# Patient Record
Sex: MALE | Race: WHITE | Employment: OTHER | URBAN - METROPOLITAN AREA
[De-identification: names, ages, dates, MRNs, and addresses within clinical notes are randomized per-mention and may not be internally consistent; named-entity substitution may affect disease eponyms.]

---

## 2019-06-28 ENCOUNTER — HOSPITAL ENCOUNTER (EMERGENCY)
Facility: CLINIC | Age: 29
Discharge: HOME | End: 2019-06-28
Attending: EMERGENCY MEDICINE | Admitting: EMERGENCY MEDICINE
Payer: MEDICAID

## 2019-06-28 ENCOUNTER — APPOINTMENT (OUTPATIENT)
Dept: CT IMAGING | Facility: CLINIC | Age: 29
End: 2019-06-28
Attending: EMERGENCY MEDICINE
Payer: MEDICAID

## 2019-06-28 VITALS
TEMPERATURE: 98.7 F | RESPIRATION RATE: 18 BRPM | BODY MASS INDEX: 20.4 KG/M2 | DIASTOLIC BLOOD PRESSURE: 90 MMHG | WEIGHT: 130 LBS | OXYGEN SATURATION: 100 % | HEIGHT: 67 IN | SYSTOLIC BLOOD PRESSURE: 119 MMHG | HEART RATE: 82 BPM

## 2019-06-28 DIAGNOSIS — S09.90XA CLOSED HEAD INJURY, INITIAL ENCOUNTER: ICD-10-CM

## 2019-06-28 DIAGNOSIS — S00.81XA ABRASION OF FOREHEAD, INITIAL ENCOUNTER: ICD-10-CM

## 2019-06-28 DIAGNOSIS — S06.0X0A CONCUSSION WITHOUT LOSS OF CONSCIOUSNESS, INITIAL ENCOUNTER: ICD-10-CM
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
## 2019-06-28 DIAGNOSIS — S50.811A ABRASION OF RIGHT FOREARM, INITIAL ENCOUNTER: ICD-10-CM

## 2019-06-28 DIAGNOSIS — S00.83XA HEMATOMA OF OCCIPITAL SURFACE OF HEAD, INITIAL ENCOUNTER: ICD-10-CM

## 2019-06-28 DIAGNOSIS — S01.81XA LACERATION OF FOREHEAD, INITIAL ENCOUNTER: ICD-10-CM

## 2019-06-28 PROCEDURE — 27210282 ZZH ADHESIVE DERMABOND SKIN

## 2019-06-28 PROCEDURE — 99284 EMERGENCY DEPT VISIT MOD MDM: CPT | Mod: 25

## 2019-06-28 PROCEDURE — 25000125 ZZHC RX 250: Performed by: EMERGENCY MEDICINE

## 2019-06-28 PROCEDURE — 70450 CT HEAD/BRAIN W/O DYE: CPT

## 2019-06-28 PROCEDURE — 12013 RPR F/E/E/N/L/M 2.6-5.0 CM: CPT

## 2019-06-28 RX ORDER — LIDOCAINE HYDROCHLORIDE 20 MG/ML
JELLY TOPICAL
Status: DISCONTINUED
Start: 2019-06-28 | End: 2019-06-28 | Stop reason: HOSPADM

## 2019-06-28 RX ORDER — LIDOCAINE HYDROCHLORIDE 20 MG/ML
10 JELLY TOPICAL ONCE
Status: COMPLETED | OUTPATIENT
Start: 2019-06-28 | End: 2019-06-28

## 2019-06-28 RX ADMIN — LIDOCAINE HYDROCHLORIDE 10 ML: 20 JELLY TOPICAL at 03:31

## 2019-06-28 ASSESSMENT — ENCOUNTER SYMPTOMS
MYALGIAS: 1
WOUND: 1
SHORTNESS OF BREATH: 0
ABDOMINAL PAIN: 0

## 2019-06-28 ASSESSMENT — MIFFLIN-ST. JEOR: SCORE: 1523.31
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
## 2019-06-28 NOTE — DISCHARGE INSTRUCTIONS
Discharge Instructions  Head Injury    You have been seen today for a head injury. Your evaluation included a history and physical examination. You may have had a CT (CAT) scan performed, though most head injuries do not require a scan. Based on this evaluation, your provider today does not feel that your head injury is serious.    Generally, every Emergency Department visit should have a follow-up clinic visit with either a primary or a specialty clinic/provider. Please follow-up as instructed by your emergency provider today.  Return to the Emergency Department if:  You are confused or you are not acting right.  Your headache gets worse or you start to have a really bad headache even with your recommended treatment plan.  You vomit (throw up) more than once.  You have a seizure.  You have trouble walking.  You have weakness or paralysis (cannot move) in an arm or a leg.  You have blood or fluid coming from your ears or nose.  You have new symptoms or anything that worries you.    Sleeping:  It is okay for you to sleep, but someone should wake you up if instructed by your provider, and someone should check on you at your usual time to wake up.     Activity:  Do not drive for at least 24 hours.  Do not drive if you have dizzy spells or trouble concentrating, or remembering things.  Do not return to any contact sports until cleared by your regular provider.     MORE INFORMATION:    Concussion:  A concussion is a minor head injury that may cause temporary problems with the way the brain works. Although concussions are important, they are generally not an emergency or a reason that a person needs to be hospitalized. Some concussion symptoms include confusion, amnesia (forgetful), nausea (sick to your stomach) and vomiting (throwing up), dizziness, fatigue, memory or concentration problems, irritability and sleep problems. For most people, concussions are mild and temporary but some will have more severe and persistent  symptoms that require on-going care and treatment.  CT Scans: Your evaluation today may have included a CT scan (CAT scan) to look for things like bleeding or a skull fracture (broken bone).  CT scans involve radiation and too many CT scans can cause serious health problems like cancer, especially in children.  Because of this, your provider may not have ordered a CT scan today if they think you are at low risk for a serious or life threatening problem.    If you were given a prescription for medicine here today, be sure to read all of the information (including the package insert) that comes with your prescription.  This will include important information about the medicine, its side effects, and any warnings that you need to know about.  The pharmacist who fills the prescription can provide more information and answer questions you may have about the medicine.  If you have questions or concerns that the pharmacist cannot address, please call or return to the Emergency Department.     Remember that you can always come back to the Emergency Department if you are not able to see your regular provider in the amount of time listed above, if you get any new symptoms, or if there is anything that worries you.  Discharge Instructions  Laceration (Cut)    You were seen today for a laceration (cut).  Your provider examined your laceration for any problems such a buried foreign body (like glass, a splinter, or gravel), or injury to blood vessels, tendons, and nerves.  Your provider may have also rinsed and/or scrubbed your laceration to help prevent an infection. It may not be possible to find all problems with your laceration on the first visit; occasionally foreign bodies or a tendon injury can go undetected.    Your laceration may have been closed in one of several ways:  No closure: many wounds will heal just fine without closure.  Stitches: regular stitches that require removal.  Staples: skin staples are often used in  the scalp/head.  Wound adhesive (glue): skin glue can be used for certain lacerations and doesn t require removal.  Wound strips (aka Butterfly bandages or steri-strips): these are bandages that help to close a wound.  Absorbable stitches:  dissolving  stitches that go away on their own and usually don t require removal.    A small percentage of wounds will develop an infection regardless of how well the wound is cared for. Antibiotics are generally not indicated to prevent an infection so are only given for a small number of high-risk wounds. Some lacerations are too high risk to close, and are left open to heal because closure can increase the likelihood that an infection will develop.    Remember that all lacerations, no matter how expertly repaired, will cause scarring. We consider many factors, techniques, and materials, in our efforts to provide the best possible cosmetic outcome.    Generally, every Emergency Department visit should have a follow-up clinic visit with either a primary or a specialty clinic/provider. Please follow-up as instructed by your emergency provider today.     Return to the Emergency Department right away if:  You have more redness, swelling, pain, drainage (pus), a bad smell, or red streaking from your laceration as these symptoms could indicate an infection.  You have a fever of 100.4 F or more.  You have bleeding that you cannot stop at home. If your cut starts to bleed, hold pressure on the bleeding area with a clean cloth or put pressure over the bandage.  If the bleeding does not stop after using constant pressure for 30 minutes, you should return to the Emergency Department for further treatment.  An area past the laceration is cool, pale, or blue compared with the other side, or has a slower return of color when squeezed.  Your dressing seems too tight or starts to get uncomfortable or painful. For children, signs of a problem might be irritability or restlessness.  You have  loss of normal function or use of an area, such as being unable to straighten or bend a finger normally.  You have a numb area past the laceration.    Return to the Emergency Department or see your regular provider if:  The laceration starts to come open.   You have something coming out of the cut or a feeling that there is something in the laceration.  Your wound will not heal, or keeps breaking open. There can always be glass, wood, dirt or other things in any wound.  They will not always show up, even on x-rays.  If a wound does not heal, this may be why, and it is important to follow-up with your regular provider.    Home Care:  Take your dressing off in 12-24 hours, or as instructed by your provider, to check your laceration. Remove the dressing sooner if it seems too tight or painful, or if it is getting numb, tingly, or pale past the dressing.  Gently wash your laceration 1-2 times daily with clean water and mild soap. It is okay to shower or run clean water over the laceration, but do not let the laceration soak in water (no swimming).  If your laceration was closed with wound adhesive or strips: pat it dry and leave it open to the air. For all other repairs: after you wash your laceration, or at least 2 times a day, apply antibiotic ointment (such as Neosporin  or Bacitracin ) to the laceration, then cover it with a Band-Aid  or gauze.  Keep the laceration clean. Wear gloves or other protective clothing if you are around dirt.    Follow-up for removal:  If your wound was closed with staples or regular stitches, they need to be removed according to the instructions and timeline specified by your provider today.  If your wound was closed with absorbable ( dissolving ) sutures, they should fall out, dissolve, or not be visible in about one week. If they are still visible, then they should be removed according to the instructions and timeline specified by your provider today.    Scars:  To help minimize  scarring:  Wear sunscreen over the healed laceration when out in the sun.  Massage the area regularly once healed.  You may apply Vitamin E to the healed wound.  Wait. Scars improve in appearance over months and years.    If you were given a prescription for medicine here today, be sure to read all of the information (including the package insert) that comes with your prescription.  This will include important information about the medicine, its side effects, and any warnings that you need to know about.  The pharmacist who fills the prescription can provide more information and answer questions you may have about the medicine.  If you have questions or concerns that the pharmacist cannot address, please call or return to the Emergency Department.     Discharge Instructions  Concussion    You were seen today for signs of a concussion.  The symptoms will vary, depending on the nature of your injury and your health. You may have: headache, confusion, nausea (feel sick to your stomach), vomiting (throwing up) and problems with memory, concentrating, or sleep. You may feel dizzy, irritable, and tired. Children and teens may need help from their parents, teachers, and coaches to watch for symptoms as they recover.    Generally, every Emergency Department visit should have a follow-up clinic visit with either a primary or a specialty clinic/provider. Please follow-up as instructed by your emergency provider today.     Return to the Emergency Department if:  Your headache gets worse or you start to have a really bad headache even with the recommended treatment plan.   You feel drowsier, have growing confusion, or slurred speech.   You keep repeating yourself.   You have strange behavior or are feeling more irritable.   You have a seizure.   You vomit (throw up) more than once.   You have trouble walking.   You have weakness or numbness.  Your neck pain gets worse.   You have a loss of consciousness.   You have blood for  fluid coming from your ears or nose.   You have new symptoms or anything that worries you.     Home Care:  Get lots of rest and get enough sleep at night. Take daytime naps or rest if you feel tired.   Limit physical activity and  thinking  activities. These can make symptoms worse.   Physical activities include gym, sports, weight training, running, exercise, and heavy lifting.   Thinking activities include homework, class work, job-related work, and screen time (phone, computer, tablet, TV, and video games).   Stick to a healthy diet and drink lots of fluids. Avoid alcohol.  As symptoms improve, you may slowly return to your daily activities. If symptoms get worse or return, reduce your activity.   Know that it is normal to feel sad or frustrated when you do not feel right and are less active.     Going Back to Work:  Your care team will tell you when you are ready to return to work.    Limit the amount of work you do soon after your injury. This may speed healing. Take breaks if your symptoms get worse. You should also reduce your physical activity as well as activities that require a lot of thinking until you see your doctor. You may need shorter work days and a lighter workload.  Avoid heavy lifting, working with machinery, driving and working at heights until your symptoms are gone or you are cleared by a provider.    Going Back to School:  If you are still having symptoms, you may need extra help at school.  Tell your teachers and school nurse about your injury and symptoms. Ask them to watch for problems with learning, memory, and concentrating. Symptoms may get worse when you do schoolwork, and you may become more irritable. You may need shorter school days, a reduced workload, and to postpone testing.  Do not drive or take gym class (physical activity) until cleared by a provider.    Returning to Sports:  Never return to play if you have any symptoms. A full recovery will reduce the chances of getting hurt  again. Remember, it is better to miss one or two games than a whole season.  You should rest from all physical activity until you see your provider. Generally, if all symptoms have completely cleared, your provider can help guide you to slowly return to sports. If symptoms return or worsen, stop the activity and see your provider.  Important: If you are in an organized sport and under age 18, you will need written consent from a healthcare provider before you return to sports. Typically, this will be your primary care or sports medicine provider. Please make an appointment.    If you were given a prescription for medicine here today, be sure to read all of the information (including the package insert) that comes with your prescription.  This will include important information about the medicine, its side effects, and any warnings that you need to know about.  The pharmacist who fills the prescription can provide more information and answer questions you may have about the medicine.  If you have questions or concerns that the pharmacist cannot address, please call or return to the Emergency Department.     Remember that you can always come back to the Emergency Department if you are not able to see your regular provider in the amount of time listed above, if you get any new symptoms, or if there is anything that worries you.        Remember that you can always come back to the Emergency Department if you are not able to see your regular provider in the amount of time listed above, if you get any new symptoms, or if there is anything that worries you.

## 2019-06-28 NOTE — ED AVS SNAPSHOT
[facility] Emergency Department  [address]  [facility] [address]  Phone:  [phone]  Fax:  [phone]                                    Sujit De La Torre   MRN: [id number]    Department:  [facility] Emergency Department   Date of Visit:  6/28/2019           After Visit Summary Signature Page    I have received my discharge instructions, and my questions have been answered. I have discussed any challenges I see with this plan with the nurse or doctor.    ..........................................................................................................................................  Patient/Patient Representative Signature      ..........................................................................................................................................  Patient Representative Print Name and Relationship to Patient    ..................................................               ................................................  Date                                   Time    ..........................................................................................................................................  Reviewed by Signature/Title    ...................................................              ..............................................  Date                                               Time          22EPIC Rev 08/18
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
## 2019-08-02 ENCOUNTER — HOSPITAL ENCOUNTER (EMERGENCY)
Facility: CLINIC | Age: 29
Discharge: HOME | End: 2019-08-02
Attending: EMERGENCY MEDICINE | Admitting: EMERGENCY MEDICINE
Payer: MEDICAID

## 2019-08-02 VITALS
WEIGHT: 137 LBS | OXYGEN SATURATION: 100 % | HEIGHT: 70 IN | HEART RATE: 68 BPM | SYSTOLIC BLOOD PRESSURE: 114 MMHG | RESPIRATION RATE: 18 BRPM | DIASTOLIC BLOOD PRESSURE: 76 MMHG | TEMPERATURE: 98.3 F | BODY MASS INDEX: 19.61 KG/M2

## 2019-08-02 DIAGNOSIS — T15.02XA FOREIGN BODY OF LEFT CORNEA, INITIAL ENCOUNTER: ICD-10-CM

## 2019-08-02 DIAGNOSIS — H18.892 CORNEAL RUST RING OF LEFT EYE: ICD-10-CM

## 2019-08-02 PROCEDURE — 99283 EMERGENCY DEPT VISIT LOW MDM: CPT

## 2019-08-02 PROCEDURE — 25000125 ZZHC RX 250: Performed by: EMERGENCY MEDICINE

## 2019-08-02 PROCEDURE — 65222 REMOVE FOREIGN BODY FROM EYE: CPT | Mod: LT

## 2019-08-02 PROCEDURE — 25000132 ZZH RX MED GY IP 250 OP 250 PS 637: Performed by: EMERGENCY MEDICINE

## 2019-08-02 RX ORDER — LORAZEPAM 1 MG/1
TABLET ORAL
Qty: 1 TABLET | Refills: 0 | Status: SHIPPED | OUTPATIENT
Start: 2019-08-02

## 2019-08-02 RX ORDER — IBUPROFEN 200 MG
600 TABLET ORAL EVERY 6 HOURS PRN
Qty: 30 TABLET | Refills: 0 | Status: SHIPPED | OUTPATIENT
Start: 2019-08-02 | End: 2019-10-23

## 2019-08-02 RX ORDER — PROPARACAINE HYDROCHLORIDE 5 MG/ML
2 SOLUTION/ DROPS OPHTHALMIC ONCE
Status: DISCONTINUED | OUTPATIENT
Start: 2019-08-02 | End: 2019-08-02 | Stop reason: HOSPADM

## 2019-08-02 RX ORDER — OFLOXACIN 3 MG/ML
SOLUTION/ DROPS OPHTHALMIC
Qty: 1 BOTTLE | Refills: 0 | Status: SHIPPED | OUTPATIENT
Start: 2019-08-02 | End: 2019-10-23

## 2019-08-02 RX ORDER — LORAZEPAM 1 MG/1
1 TABLET ORAL ONCE
Status: COMPLETED | OUTPATIENT
Start: 2019-08-02 | End: 2019-08-02

## 2019-08-02 RX ORDER — HYDROCODONE BITARTRATE AND ACETAMINOPHEN 5; 325 MG/1; MG/1
1-2 TABLET ORAL EVERY 6 HOURS PRN
Qty: 12 TABLET | Refills: 0 | Status: SHIPPED | OUTPATIENT
Start: 2019-08-02

## 2019-08-02 RX ORDER — ERYTHROMYCIN 5 MG/G
0.5 OINTMENT OPHTHALMIC 4 TIMES DAILY
Qty: 1 TUBE | Refills: 0 | Status: SHIPPED | OUTPATIENT
Start: 2019-08-02 | End: 2019-08-07

## 2019-08-02 RX ADMIN — FLUORESCEIN SODIUM 1 STRIP: 1 STRIP OPHTHALMIC at 20:24

## 2019-08-02 RX ADMIN — LORAZEPAM 1 MG: 1 TABLET ORAL at 20:42

## 2019-08-02 ASSESSMENT — ENCOUNTER SYMPTOMS: EYE PAIN: 1

## 2019-08-02 ASSESSMENT — MIFFLIN-ST. JEOR: SCORE: 1602.68

## 2019-08-02 NOTE — ED AVS SNAPSHOT
Emergency Department  [address] [facility] [address]  Phone:  [phone]  Fax:  [phone]                                    Sujit De La Torre   MRN: [id number]    Department:   Emergency Department   Date of Visit:  8/2/2019           After Visit Summary Signature Page    I have received my discharge instructions, and my questions have been answered. I have discussed any challenges I see with this plan with the nurse or doctor.    ..........................................................................................................................................  Patient/Patient Representative Signature      ..........................................................................................................................................  Patient Representative Print Name and Relationship to Patient    ..................................................               ................................................  Date                                   Time    ..........................................................................................................................................  Reviewed by Signature/Title    ...................................................              ..............................................  Date                                               Time          22EPIC Rev 08/18
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
## 2019-08-03 NOTE — DISCHARGE INSTRUCTIONS
Return for worsening eye pain, redness, swelling new concerns. Use antibiotics as prescribed. Follow up with eye doctor of your choice after the weekend to have rust ring removed/evaluated.    Opioid Medication Information    You have been given a prescription for an opioid (narcotic) pain medicine and/or have received a pain medicine while here in the Emergency Department. These medicines can make you drowsy or impaired. You must not drive, operate dangerous equipment, or engage in any other dangerous activities while taking these medications. If you drive while taking these medications, you could be arrested for driving under the influence (DUI). Do not drink any alcohol while you are taking these medications.     Opioid pain medications can cause addiction. If you have a history of chemical dependency of any type, you are at a higher risk of becoming addicted to pain medications.  Only take these prescribed medications to treat your pain when all other options have been tried. Take it for as short a time and as few doses as possible. Store your pain pills in a secure place, as they are frequently stolen and provide a dangerous opportunity for children or visitors in your house to start abusing these powerful medications. We will not replace any lost or stolen medicine.    If you do not finish your medication, it is a good idea to get rid of it but please do not flush it down the toilet. Please dispose of the remaining medication at a local pharmacy or law enforcement facility. The [place] [employer] has additional information on medication disposal: https://www.pca.[email or web address]/living-green/managing-unwanted-medications.      Many prescription pain medications contain Tylenol  (acetaminophen), including Vicodin , Tylenol #3 , Norco , Lortab , and Percocet .  You should not take any extra pills of Tylenol  if you are using these prescription medications or you can get very sick.  Do not ever take  more than 3000 mg of acetaminophen in any 24 hour period.    All opioids tend to cause constipation. Drink plenty of water and eat foods that have a lot of fiber, such as fruits, vegetables, prune juice, apple juice and high fiber cereal.  Take a laxative if you don t move your bowels at least every other day. Miralax , Milk of Magnesia, Colace , or Senna  can be used to keep you regular.

## 2019-08-03 NOTE — ED NOTES
While in the Eye Room, pt had a vagal reaction and panic attack. Pt hyperventilating and getting cramps in his hands. Pt laid supine and encouraged to slow down breathing. Dr. Grossman and pt's mother present.

## 2019-08-03 NOTE — ED PROVIDER NOTES
"  History     Chief Complaint:  Eye Problem    HPI   Sujit De La Torre is a 28 year old male who presents for evaluation of an eye problem. This morning about two hours after waking up, the patient was driving when he started to develop left eye irritation and pain and a foreign body sensation in the eye with somewhat blurred vision. He notes that his pain worsens when he looks to the left. Due to this eye irritation, the patient came into the ED for evaluation. 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 He notes that he was also drilling on the metal handlebars of his motorcycle yesterday evening, but he does not remember any specific injury to his eye. He does not use contact lenses. He reports that his tetanus status was updated within the last three years.     Allergies:  NKDA      Medications:    Aspirin 325 mg      Past Medical History:    Cardiac murmur     Past Surgical History:    History reviewed. No pertinent past surgical history.     Family History:    History reviewed. No pertinent family history.     Social History:  Tobacco use:    Current every day smoker - 1.00 packs / day   Alcohol use:    Positive   Marital status:    Single   Accompanied to ED by:  Friend      Review of Systems   Eyes: Positive for pain (left) and visual disturbance (left).   All other systems reviewed and are negative.    Physical Exam   First Vitals:  BP: 116/70  Pulse: 68  Heart Rate: 69  Temp: 98.3  F (36.8  C)  Resp: 18  Height: 177.8 cm (5' 10\")  Weight: 62.1 kg (137 lb)  SpO2: 99 %    Physical Exam  VS: Reviewed per above  HENT: Mucous membranes moist  EYES:   No periorbital swelling nor erythema  Visual acuity: Right: 20/40. Left: 20/40.  Pupil Exam: PERRL, no APD  Extraocular movements: intact  Lids: no FB noted with upper and lower lid eversion.    Slit Lamp Exam:    Left:  Conjunctiva/Sclera: mild injection of sclera  Cornea: ocular " foreign body with surrounding rust ring at ~7 oclock sparing pupil. negative ki sign nor corneal flourescein uptake  CV: Rate as noted,  regular rhythm.   RESP: Effort normal. Breath sounds are normal bilaterally.  NEURO: Alert, moving all extremities  MSK: No deformity of the extremities  SKIN: Warm and dry    Emergency Department Course     Procedures:  Procedure: Foreign body removal and rust ring removal     Consent: Verbal consent was obtained from the pt after reviewing the risks and benefits.    Indication: Foreign body imbedded within the cornea of the left eye.    Anesthesia: Alcaine 2 gtt in the left eye     Technique:  Using the slit lamp, a foreign body as noted in the exam was appreciated and removed under a slip lamp with the tip of an 18g needle held perpendicular to the corneal FB. No additional foreign body appreciated.    Outcome: the pt tolerated the procedure but had hyperventilation and lightheadedness after event that resolved with ativan and time. There was scant rust ring still present and did not remove fully given depth of it. Pt will follow up with ophthalmology for reevaluation in 1-2 days.    Interventions:  2024 Fluorescein 1 strip left eye   2042 Ativan 1 mg PO     Emergency Department Course:  Nursing notes and vitals reviewed.  2016: I performed an exam of the patient as documented above.     2025:  A ocular foreign body removal was performed as outlined in the procedure note above.  The patient tolerated well and there were no complications.      Findings and plan explained to the Patient. Patient discharged home with instructions regarding supportive care, medications, and reasons to return. The importance of close follow-up was reviewed. The patient was prescribed Erythromycin, Norco, Ibuprofen, Ativan, and Ocuflox      Impression & Plan      Medical Decision Making:  Pt presents to ER for evaluation of left eye pain. Exam with corneal FB sparing pupil. No corneal abrasion or  ulcer or signs of open globe on exam. Also no signs preseptal or orbital cellulitis. Corneal FB removed per procedure note above. He had what seems like near-syncope/panic reaction with hyperventilation and carpo-pedal spams after procedure that resolved with ativan. Plan to treat with FQ and erythromycin opthalmic abx, pain meds and f/u with optho after weekend for likely rust ring removal and exam.     Diagnosis:    ICD-10-CM    1. Corneal rust ring of left eye H18.892    2. Foreign body of left cornea, initial encounter T15.02XA        Disposition:  Discharged to home with Erythromycin, Norco, Ibuprofen, Ativan, and Ocuflox     Discharge Medications:  erythromycin 5 MG/GM ophthalmic ointment  Commonly known as:  ROMYCIN  0.5 inches, Left Eye, 4 TIMES DAILY     HYDROcodone-acetaminophen 5-325 MG tablet  Commonly known as:  NORCO  1-2 tablets, Oral, EVERY 6 HOURS PRN     ibuprofen 200 MG tablet  Commonly known as:  ADVIL/MOTRIN  600 mg, Oral, EVERY 6 HOURS PRN     LORazepam 1 MG tablet  Commonly known as:  ATIVAN  Take 1mg as needed for anxiety before a future eye procedure/exam. Do not combine with vicodin.     ofloxacin 0.3 % ophthalmic solution  Commonly known as:  OCUFLOX  Place 2 drops every 2 hours in the left eye while awake for the first 2 days, and then 2 drops every 4-8 hours for the remaining 3 days.            Pavan BARRON, am serving as a scribe at 8:16 PM on 8/2/2019 to document services personally performed by Oliver Grossman MD, based on my observations and the provider's statements to me.     EMERGENCY DEPARTMENT       Oliver Grossman MD  08/02/19 [id number]

## 2019-10-23 ENCOUNTER — HOSPITAL ENCOUNTER (EMERGENCY)
Facility: CLINIC | Age: 29
Discharge: HOME | End: 2019-10-23
Attending: EMERGENCY MEDICINE | Admitting: EMERGENCY MEDICINE
Payer: COMMERCIAL

## 2019-10-23 VITALS
TEMPERATURE: 97.9 F | HEART RATE: 72 BPM | RESPIRATION RATE: 16 BRPM | SYSTOLIC BLOOD PRESSURE: 120 MMHG | DIASTOLIC BLOOD PRESSURE: 76 MMHG | OXYGEN SATURATION: 99 %

## 2019-10-23 DIAGNOSIS — S05.01XA ABRASION OF RIGHT CORNEA, INITIAL ENCOUNTER: ICD-10-CM

## 2019-10-23 DIAGNOSIS — T15.91XA FOREIGN BODY OF RIGHT EYE, INITIAL ENCOUNTER: ICD-10-CM

## 2019-10-23 PROCEDURE — 99282 EMERGENCY DEPT VISIT SF MDM: CPT

## 2019-10-23 RX ORDER — TETRACAINE HYDROCHLORIDE 5 MG/ML
SOLUTION OPHTHALMIC
Status: DISCONTINUED
Start: 2019-10-23 | End: 2019-10-23 | Stop reason: HOSPADM

## 2019-10-23 RX ORDER — ERYTHROMYCIN 5 MG/G
0.5 OINTMENT OPHTHALMIC 4 TIMES DAILY
Qty: 1 TUBE | Refills: 0 | Status: SHIPPED | OUTPATIENT
Start: 2019-10-23 | End: 2019-10-28

## 2019-10-23 RX ORDER — IBUPROFEN 600 MG/1
600 TABLET, FILM COATED ORAL EVERY 6 HOURS PRN
Qty: 20 TABLET | Refills: 0 | Status: SHIPPED | OUTPATIENT
Start: 2019-10-23 | End: 2020-01-09

## 2019-10-23 ASSESSMENT — ENCOUNTER SYMPTOMS
WEAKNESS: 0
EYE PAIN: 1
FEVER: 0
EYE REDNESS: 1
HEADACHES: 0

## 2019-10-23 NOTE — ED PROVIDER NOTES
History     Chief Complaint:  Foreign Body in Eye (right)    HPI   Sujit De La Torre is a 29 year old male who presents with concerns for foreign body to right eye.  Patient states that he was working under his car, wearing safety goggles but believes he has metal in his eye.  He reports increased tearing sensation, redness to the eye as well as pain.  No reported fevers, facial redness or headache.  Denies contact use. Tetanus UTD.     Allergies:  No Known Allergies     Medications:    Ativan   ofloxacin    Past Medical History:    Cardiac murmur  generalized anxiety disorder    Past Surgical History:    The patient does not have any pertinent past surgical history.    Family History:    No past pertinent family history.    Social History:   Smoking Status: current everyday smoker   Smokeless Tobacco: Never Used  Alcohol Use: Positive  Marital Status:  Single      Review of Systems   Constitutional: Negative for fever.   Eyes: Positive for pain, redness and visual disturbance.   Neurological: Negative for weakness and headaches.         Physical Exam     Patient Vitals for the past 24 hrs:   BP Temp Temp src Pulse Heart Rate Resp SpO2   10/23/19 1445 120/76 97.9  F (36.6  C) Oral 72 73 16 99 %       Physical Exam   General: Resting comfortably   Head:  The scalp, face, and head appear normal  Slit Lamp Exam:  - R. Scleral injection; L. Sclera normal  - EOMI  - No foreign body with eversion of the lids; foreign body visualized at 3 o'clock position in R. eye  - fluorescein uptake at 6 o'clock position in R. eye suggests abrasion and no Kt sign  - No Cell or flare  - No hypopion or hyphema  -Visual acuity: 20/30 right eye; 20/25 left eye  No periorbital redness/swelling  ENT:    The nose is normal  Neck:  Normal range of motion  MSK:  Moves all extremities equally  Skin:  No rash or lesions noted.  Neuro: Speech is normal and fluent  Psych:  Awake. Alert.  Normal affect.        Emergency Department Course      Procedures   Procedure: foreign body removal  Indication: right eye foreign body   Procedure: cotton swab was utilized to lift the foreign body off the cornea on the first attempt. The patient tolerated the procedure well with no complications.  Interventions:  1515 ful-charlie 1 mg   1515 tetracaine 0.5%     Emergency Department Course:     Nursing notes and vitals reviewed.    1500 I performed an exam of the patient as documented above.    1520  I removed the patient's foreign body, see procedure note above. I answered all questions prior to discharge.       Impression & Plan      Medical Decision Making:  Patient presents with foreign body sensation to right eye.  He is nontoxic on arrival.  There is no evidence of globe rupture on exam though does have metallic foreign body that was removed at bedside.  He also is noted to have concerns for a corneal abrasion.  There is no evidence of periorbital/orbital cellulitis.  I discussed with patient given concern for metallic foreign body I do have concern for rest ring as well.  He will be provided ophthalmology referral for close outpatient follow-up within 24 to 48 hours.  He will also be initiated on antibiotic ointment given concern for corneal abrasion.  Pain control with Tylenol/Motrin. I offered analgesia during time in the ED though he declined.  Return to ED for worsening pain, vision changes, headache or should symptoms worsen or change.    Diagnosis:    ICD-10-CM    1. Foreign body of right eye, initial encounter T15.91XA    2. Abrasion of right cornea, initial encounter S05.01XA      Disposition:   Findings and plan explained to the Patient. Patient discharged home with instructions regarding supportive care, medications, and reasons to return. The importance of close follow-up was reviewed. The patient was prescribed Romycin.       Discharge Medications:  START taking      Dose / Directions   * erythromycin 5 MG/GM ophthalmic ointment  Commonly known as:   ROMYCIN      Dose:  0.5 inch  Place 0.5 inches into the right eye 4 times daily for 5 days  Quantity:  1 Tube  Refills:  0         * This list has 1 medication(s) that are the same as other medications prescribed for you. Read the directions carefully, and ask your doctor or other care provider to review them with you.            CONTINUE these medicines which may have CHANGED, or have new prescriptions. If we are uncertain of the size of tablets/capsules you have at home, strength may be listed as something that might have changed.      Dose / Directions   ibuprofen 600 MG tablet  Commonly known as:  ADVIL/MOTRIN  This may have changed:      medication strength    reasons to take this      Dose:  600 mg  Take 1 tablet (600 mg) by mouth every 6 hours as needed  Quantity:  20 tablet  Refills:  0        STOP taking    ofloxacin 0.3 % ophthalmic solution  Commonly known as:  OCUFLOX          Scribe Disclosure:  I, Ginette Valdes, am serving as a scribe at 3:56 PM on 10/23/2019 to document services personally performed by Catrina Cabrera DO based on my observations and the provider's statements to me.  [facility] EMERGENCY DEPARTMENT       Catrina Cabrera DO  10/23/19 5186

## 2019-10-23 NOTE — ED TRIAGE NOTES
Pt here with c/o metal fragment in R eye while trying to changes breaks about an hour PTA. Pt was wearing safety glasses. Tried getting it out with a magnet, unsuccessful.

## 2019-10-23 NOTE — ED AVS SNAPSHOT
[facility] Emergency Department  [address]  [facility] [address]  Phone:  [phone]  Fax:  [phone]                                    Sujit De La Torre   MRN: [id number]    Department:  [facility] Emergency Department   Date of Visit:  10/23/2019           After Visit Summary Signature Page    I have received my discharge instructions, and my questions have been answered. I have discussed any challenges I see with this plan with the nurse or doctor.    ..........................................................................................................................................  Patient/Patient Representative Signature      ..........................................................................................................................................  Patient Representative Print Name and Relationship to Patient    ..................................................               ................................................  Date                                   Time    ..........................................................................................................................................  Reviewed by Signature/Title    ...................................................              ..............................................  Date                                               Time          22EPIC Rev 08/18

## 2020-01-07 ENCOUNTER — HOSPITAL ENCOUNTER (EMERGENCY)
Facility: CLINIC | Age: 30
Discharge: HOME | End: 2020-01-07
Attending: EMERGENCY MEDICINE | Admitting: EMERGENCY MEDICINE
Payer: COMMERCIAL

## 2020-01-07 VITALS
TEMPERATURE: 100.1 F | DIASTOLIC BLOOD PRESSURE: 67 MMHG | SYSTOLIC BLOOD PRESSURE: 109 MMHG | HEART RATE: 79 BPM | OXYGEN SATURATION: 98 % | RESPIRATION RATE: 16 BRPM

## 2020-01-07 DIAGNOSIS — J10.1 INFLUENZA B: ICD-10-CM

## 2020-01-07 LAB
FLUAV+FLUBV AG SPEC QL: NEGATIVE
FLUAV+FLUBV AG SPEC QL: POSITIVE
SPECIMEN SOURCE: ABNORMAL

## 2020-01-07 PROCEDURE — 87804 INFLUENZA ASSAY W/OPTIC: CPT | Performed by: EMERGENCY MEDICINE

## 2020-01-07 PROCEDURE — 25000132 ZZH RX MED GY IP 250 OP 250 PS 637: Performed by: EMERGENCY MEDICINE

## 2020-01-07 PROCEDURE — 99283 EMERGENCY DEPT VISIT LOW MDM: CPT

## 2020-01-07 RX ORDER — IBUPROFEN 600 MG/1
600 TABLET, FILM COATED ORAL ONCE
Status: COMPLETED | OUTPATIENT
Start: 2020-01-07 | End: 2020-01-07

## 2020-01-07 RX ADMIN — IBUPROFEN 600 MG: 600 TABLET, FILM COATED ORAL at 13:34

## 2020-01-07 ASSESSMENT — ENCOUNTER SYMPTOMS
CHILLS: 1
FEVER: 1
COUGH: 1
MYALGIAS: 1

## 2020-01-07 NOTE — ED PROVIDER NOTES
History     Chief Complaint:  Flu Symptoms    HPI   Sujit De La Torre is a 29 year old, otherwise healthy male who presents for evaluation of flu symptoms, including a cough, congestion, chills, and body aches. He reports starting to feel ill about three days ago. He has felt feverish at home, but has not recorded any temperatures. He has been using over the counter Theraflu without much relief. He endorses many sick contacts with similar symptoms. He did not get the flu shot this year.     Allergies:  NKDA    Medications:    The patient is currently on no regular medications.    Past Medical History:    Cardiac murmur    Past Surgical History:    The patient does not have any pertinent past surgical history.    Family History:    No past pertinent family history.    Social History:  Marital Status:  Single [1]  Presents to the ED with his uncle.   Positive for tobacco use. Comment: 1 PPD.   Positive for alcohol use.   Negative for drug use.      Review of Systems   Constitutional: Positive for chills and fever.   HENT: Positive for congestion.    Respiratory: Positive for cough.    Cardiovascular: Negative for chest pain.   Musculoskeletal: Positive for myalgias.   All other systems reviewed and are negative.      Physical Exam     Patient Vitals for the past 24 hrs:   BP Temp Temp src Heart Rate Resp SpO2   01/07/20 1237 127/83 100.1  F (37.8  C) Temporal 99 16 100 %      Physical Exam  General:              Well-nourished              Speaking in full sentences  Eyes:              Conjunctiva without injection or scleral icterus              PERRL              EOM full w/out entrapment or proptosis  ENT:              Moist mucous membranes              Posterior oropharynx clear with mild erythema, no exudate              No tonsillar hypertrophy, exudate, asymmetry, nor uvular deviation              No oral lesions              Bilateral TM translucent and gray without air/fluid level or overlying erythema,  bony landmarks visualized.              Nares patent              Pinnae normal              No midface swelling, erythema, or asymmetry  Neck:              Full ROM              No stiffness appreciated  Resp:              Lungs CTAB              No crackles, wheezing or audible rubs              Good air movement  CV:                    Normal rate, regular rhythm              S1 and S2 present              No murmur, gallop or rub  GI:              BS present              Abdomen soft without distention              Non-tender to light and deep palpation              No guarding or rebound tenderness  Skin:              Warm, dry, well perfused              No rashes or open wounds on exposed skin  MSK:              Moves all extremities              No focal deformities or swelling  Neuro:              Alert              Answers questions appropriately              Moves all extremities equally              Gait stable  Psych:              Normal affect, normal mood    Emergency Department Course     Laboratory:  Influenza B: Positive (A)    Procedures:  None    Interventions:  1334 Ibuprofen, 600 mg, PO    Emergency Department Course:  Nursing notes and vitals reviewed.   The patient's nose was swabbed and this sample was sent for influenza antigen, findings above.      1410: I performed an exam of the patient as documented above, and discussed the results of his workup thus far.     Findings and plan explained to the Patient. Patient discharged home with instructions regarding supportive care, medications, and reasons to return. The importance of close follow-up was reviewed.     I personally reviewed the laboratory results with the Patient and answered all related questions prior to discharge.    Impression & Plan      Medical Decision Making:  Sujit De La Torre is a 29 year old male who presents to the ER for flu-like symptoms.  Vital signs on presentation reveal no acute abnormalities.  Differential  diagnosis is broad, including but not limited to influenza, influenza-like-illness, serious bacterial infection (bacteremia, meningitis, UTI/pyelopnephritis, strep pharyngitis, pneumonia), deep space neck infection, among others.     At this point, patient's signs and symptoms are consistent with influenza.  Testing as above, positive for influenza B.  Patient is outside the window for treatment with Tamiflu, and thus I feel this is unlikely to provide benefit to the patient.  This was discussed with him who is in agreement and verbalized understanding.  At present, bacterial meningitis seems very unlikely.  Patient is non-toxic in appearance, interacting with this writer appropriately, with a supple neck exam, and normal vital signs.  Clinically, the risks of LP are felt to outweigh the benefits at this time. They are at risk for pneumonia but no signs of this are detected on today's visit.  No other signs or symptoms consistent with UTI/pyelonephritis, strep pharyngitis, or deep space neck infection are detected at this time.  Close followup of primary care physician is indicated and return to the ED for high fevers > 103 for more than 48 hours more, increasing productive cough, shortness of breath, or confusion.      Diagnosis:    ICD-10-CM    1. Influenza B J10.1        Disposition:  discharged to home    Scribe Disclosure:  I, Lacy Hugo, am serving as a scribe on 1/7/2020 at 2:10 PM to personally document services performed by Felix Starr MD based on my observations and the provider's statements to me.     1/7/2020   [facility] EMERGENCY DEPARTMENT       Felix Starr MD  01/07/20 9628

## 2020-01-07 NOTE — ED AVS SNAPSHOT
[facility] Emergency Department  [address]  [facility] [address]  Phone:  [phone]  Fax:  [phone]                                    Sujit De La Torre   MRN: [id number]    Department:  [facility] Emergency Department   Date of Visit:  1/7/2020           After Visit Summary Signature Page    I have received my discharge instructions, and my questions have been answered. I have discussed any challenges I see with this plan with the nurse or doctor.    ..........................................................................................................................................  Patient/Patient Representative Signature      ..........................................................................................................................................  Patient Representative Print Name and Relationship to Patient    ..................................................               ................................................  Date                                   Time    ..........................................................................................................................................  Reviewed by Signature/Title    ...................................................              ..............................................  Date                                               Time          22EPIC Rev 08/18

## 2020-01-09 ENCOUNTER — APPOINTMENT (OUTPATIENT)
Dept: GENERAL RADIOLOGY | Facility: CLINIC | Age: 30
End: 2020-01-09
Attending: EMERGENCY MEDICINE
Payer: COMMERCIAL

## 2020-01-09 ENCOUNTER — HOSPITAL ENCOUNTER (EMERGENCY)
Facility: CLINIC | Age: 30
Discharge: HOME | End: 2020-01-09
Attending: EMERGENCY MEDICINE | Admitting: EMERGENCY MEDICINE
Payer: COMMERCIAL

## 2020-01-09 VITALS
WEIGHT: 121.47 LBS | DIASTOLIC BLOOD PRESSURE: 89 MMHG | OXYGEN SATURATION: 100 % | TEMPERATURE: 99 F | BODY MASS INDEX: 18.41 KG/M2 | SYSTOLIC BLOOD PRESSURE: 121 MMHG | HEIGHT: 68 IN | HEART RATE: 82 BPM | RESPIRATION RATE: 20 BRPM

## 2020-01-09 DIAGNOSIS — J10.1 INFLUENZA B: ICD-10-CM

## 2020-01-09 DIAGNOSIS — R07.89 ATYPICAL CHEST PAIN: ICD-10-CM

## 2020-01-09 LAB
ANION GAP SERPL CALCULATED.3IONS-SCNC: 2 MMOL/L (ref 3–14)
BASOPHILS # BLD AUTO: 0 10E9/L (ref 0–0.2)
BASOPHILS NFR BLD AUTO: 0.9 %
BUN SERPL-MCNC: 17 MG/DL (ref 7–30)
CALCIUM SERPL-MCNC: 8.6 MG/DL (ref 8.5–10.1)
CHLORIDE SERPL-SCNC: 104 MMOL/L (ref 94–109)
CO2 SERPL-SCNC: 31 MMOL/L (ref 20–32)
CREAT SERPL-MCNC: 0.8 MG/DL (ref 0.66–1.25)
DIFFERENTIAL METHOD BLD: ABNORMAL
EOSINOPHIL # BLD AUTO: 0 10E9/L (ref 0–0.7)
EOSINOPHIL NFR BLD AUTO: 1.7 %
ERYTHROCYTE [DISTWIDTH] IN BLOOD BY AUTOMATED COUNT: 11.1 % (ref 10–15)
GFR SERPL CREATININE-BSD FRML MDRD: >90 ML/MIN/{1.73_M2}
GLUCOSE SERPL-MCNC: 106 MG/DL (ref 70–99)
HCT VFR BLD AUTO: 43.2 % (ref 40–53)
HGB BLD-MCNC: 14.4 G/DL (ref 13.3–17.7)
IMM GRANULOCYTES # BLD: 0 10E9/L (ref 0–0.4)
IMM GRANULOCYTES NFR BLD: 0 %
INTERPRETATION ECG - MUSE: NORMAL
LYMPHOCYTES # BLD AUTO: 0.8 10E9/L (ref 0.8–5.3)
LYMPHOCYTES NFR BLD AUTO: 35.5 %
MCH RBC QN AUTO: 29.8 PG (ref 26.5–33)
MCHC RBC AUTO-ENTMCNC: 33.3 G/DL (ref 31.5–36.5)
MCV RBC AUTO: 89 FL (ref 78–100)
MONOCYTES # BLD AUTO: 0.4 10E9/L (ref 0–1.3)
MONOCYTES NFR BLD AUTO: 15.4 %
NEUTROPHILS # BLD AUTO: 1.1 10E9/L (ref 1.6–8.3)
NEUTROPHILS NFR BLD AUTO: 46.5 %
NRBC # BLD AUTO: 0 10*3/UL
NRBC BLD AUTO-RTO: 0 /100
PLATELET # BLD AUTO: 94 10E9/L (ref 150–450)
POTASSIUM SERPL-SCNC: 3.9 MMOL/L (ref 3.4–5.3)
RBC # BLD AUTO: 4.83 10E12/L (ref 4.4–5.9)
SODIUM SERPL-SCNC: 137 MMOL/L (ref 133–144)
TROPONIN I SERPL-MCNC: <0.015 UG/L (ref 0–0.04)
WBC # BLD AUTO: 2.3 10E9/L (ref 4–11)

## 2020-01-09 PROCEDURE — 84484 ASSAY OF TROPONIN QUANT: CPT | Performed by: EMERGENCY MEDICINE

## 2020-01-09 PROCEDURE — 99285 EMERGENCY DEPT VISIT HI MDM: CPT | Mod: 25

## 2020-01-09 PROCEDURE — 25000132 ZZH RX MED GY IP 250 OP 250 PS 637: Performed by: EMERGENCY MEDICINE

## 2020-01-09 PROCEDURE — 96374 THER/PROPH/DIAG INJ IV PUSH: CPT

## 2020-01-09 PROCEDURE — 93005 ELECTROCARDIOGRAM TRACING: CPT

## 2020-01-09 PROCEDURE — 71046 X-RAY EXAM CHEST 2 VIEWS: CPT

## 2020-01-09 PROCEDURE — 96361 HYDRATE IV INFUSION ADD-ON: CPT

## 2020-01-09 PROCEDURE — 25000128 H RX IP 250 OP 636: Performed by: EMERGENCY MEDICINE

## 2020-01-09 PROCEDURE — 85025 COMPLETE CBC W/AUTO DIFF WBC: CPT | Performed by: EMERGENCY MEDICINE

## 2020-01-09 PROCEDURE — 80048 BASIC METABOLIC PNL TOTAL CA: CPT | Performed by: EMERGENCY MEDICINE

## 2020-01-09 PROCEDURE — 25800030 ZZH RX IP 258 OP 636: Performed by: EMERGENCY MEDICINE

## 2020-01-09 RX ORDER — ACETAMINOPHEN 500 MG
1000 TABLET ORAL ONCE
Status: COMPLETED | OUTPATIENT
Start: 2020-01-09 | End: 2020-01-09

## 2020-01-09 RX ORDER — ACETAMINOPHEN 500 MG
1000 TABLET ORAL EVERY 4 HOURS PRN
Qty: 60 TABLET | Refills: 0 | Status: SHIPPED | OUTPATIENT
Start: 2020-01-09 | End: 2020-01-16

## 2020-01-09 RX ORDER — KETOROLAC TROMETHAMINE 15 MG/ML
15 INJECTION, SOLUTION INTRAMUSCULAR; INTRAVENOUS ONCE
Status: COMPLETED | OUTPATIENT
Start: 2020-01-09 | End: 2020-01-09

## 2020-01-09 RX ORDER — IBUPROFEN 800 MG/1
800 TABLET, FILM COATED ORAL EVERY 8 HOURS PRN
Qty: 60 TABLET | Refills: 0 | Status: SHIPPED | OUTPATIENT
Start: 2020-01-09 | End: 2020-01-17

## 2020-01-09 RX ADMIN — ACETAMINOPHEN 1000 MG: 500 TABLET, FILM COATED ORAL at 22:09

## 2020-01-09 RX ADMIN — SODIUM CHLORIDE 1000 ML: 9 INJECTION, SOLUTION INTRAVENOUS at 21:11

## 2020-01-09 RX ADMIN — KETOROLAC TROMETHAMINE 15 MG: 15 INJECTION, SOLUTION INTRAMUSCULAR; INTRAVENOUS at 22:09

## 2020-01-09 ASSESSMENT — ENCOUNTER SYMPTOMS
CHILLS: 1
COUGH: 1
FEVER: 0
SHORTNESS OF BREATH: 1

## 2020-01-09 ASSESSMENT — MIFFLIN-ST. JEOR: SCORE: 1495.5

## 2020-01-09 NOTE — ED AVS SNAPSHOT
[facility] Emergency Department  [address]  [facility] [address]  Phone:  [phone]  Fax:  [phone]                                    Sujit De La Torre   MRN: [id number]    Department:  [facility] Emergency Department   Date of Visit:  1/9/2020           After Visit Summary Signature Page    I have received my discharge instructions, and my questions have been answered. I have discussed any challenges I see with this plan with the nurse or doctor.    ..........................................................................................................................................  Patient/Patient Representative Signature      ..........................................................................................................................................  Patient Representative Print Name and Relationship to Patient    ..................................................               ................................................  Date                                   Time    ..........................................................................................................................................  Reviewed by Signature/Title    ...................................................              ..............................................  Date                                               Time          22EPIC Rev 08/18

## 2020-01-10 NOTE — ED PROVIDER NOTES
"  History     Chief Complaint:  Flu Symptoms and Shortness of Breath    HPI   Sujit De La Torre is a 29 year old male who presents with influenza symptoms and shortness of breath . The patient was seen in the Emergency Department on 1/6 and diagnosed with influenza B. He is experiencing body aches, chills, and a cough since his last visit. He has been taking three ibuprofen every four hours, his last dose was at 0800 today. The patient endorses chest pain when he coughs and breaths. He endorses some shortness of breath which he attributes to congestion. Of note, the patient was evaluated by a cardiologist at [facility] in [place] for a heart murmur. The patient denies fever.    Allergies:  No Known Allergies     Medications:    The patient is not currently taking any prescribed medications.    Past Medical History:    Cardiac murmur     Past Surgical History:    The patient does not have any pertinent past surgical history.    Family History:    No past pertinent family history.    Social History:  The patient was accompanied to the ED by significant other.  Smoking Status: current every day smoker   Smokeless Tobacco: Never Used  Alcohol Use: Yes  Drug Use: No  Marital Status:  Single     Review of Systems   Constitutional: Positive for chills. Negative for fever.   Respiratory: Positive for cough and shortness of breath.    Cardiovascular: Positive for chest pain.   All other systems reviewed and are negative.      Physical Exam     Patient Vitals for the past 24 hrs:   BP Temp Temp src Pulse Resp SpO2 Height Weight   01/09/20 2038 121/89 99  F (37.2  C) Oral 82 20 100 % 1.727 m (5' 8\") 55.1 kg (121 lb 7.6 oz)       Physical Exam  Vitals signs reviewed.   Constitutional:       Appearance: He is well-developed.   HENT:      Head: Normocephalic.      Mouth/Throat:      Mouth: Mucous membranes are moist.   Neck:      Musculoskeletal: Normal range of motion.   Cardiovascular:      Rate and Rhythm: Normal " rate and regular rhythm.   Pulmonary:      Effort: Pulmonary effort is normal.      Breath sounds: Normal breath sounds.   Musculoskeletal: Normal range of motion.   Skin:     General: Skin is warm.      Capillary Refill: Capillary refill takes less than 2 seconds.   Neurological:      Mental Status: He is alert.   Psychiatric:         Mood and Affect: Mood is anxious.           Emergency Department Course   ECG:  ECG taken at 2106, ECG read at 2106  Normal sinus rhythm  Normal ECG  Rate 83 bpm. SC interval 140 ms. QRS duration 98 ms. QT/QTc 348/408 ms. P-R-T axes 80 76 54.    Imaging:  Radiology findings were communicated with the patient who voiced understanding of the findings.    Chest XR,  PA & LAT  Negative chest.  Reading per radiology    Laboratory:  Laboratory findings were communicated with the patient who voiced understanding of the findings.    CBC: WBC 2.3 (L), HGB 14.4, PLT 94 (L)  BMP: anion gap 2 (L), glucose 106 (H) o/w WNL (Creatinine 0.80)  Troponin (Collected 2110): <0.015    Interventions:  2111 NS bolus 1,000 ml IV    Emergency Department Course:  Nursing notes and vitals reviewed.    2054 I performed an exam of the patient as documented above.     IV was inserted and blood was drawn for laboratory testing, results above.    The patient was sent for a chest XR while in the emergency department, results above.      2209 I returned to update the patient about their plan for discharge.     Findings and plan explained to the Patient. Patient discharged home with instructions regarding supportive care, medications, and reasons to return. The importance of close follow-up was reviewed. The patient was prescribed tylenol.     Impression & Plan      Medical Decision Making:  Sujit De La Torre is a 29 year old male who presents to the emergency department today for evaluation of chest pain and feeling worse after diagnosis of influenza.  Patient is anxious appearing.  Chest x-ray is performed to rule  out pneumothorax and post influenza pneumonia.  X-ray is negative.  Lab work is also performed as well as EKG no signs of pericarditis or troponin leak.  White and red blood cell count are normal.  Patient is offered IV hydration and Tylenol with improvement.  Patient offered reassurance explained that influenza is a long illness recommended antipyretics anti-myalgia medication and follow-up with primary care as needed.       Diagnosis:    ICD-10-CM    1. Atypical chest pain R07.89    2. Influenza B J10.1        Disposition:   The patient is discharged to home.    Discharge Medications:  New Prescriptions    No medications on file       Scribe Disclosure:  Shannan BARRON, am serving as a scribe at 8:47 PM on 1/9/2020 to document services personally performed by Felix Carter MD based on my observations and the provider's statements to me.     [facility] EMERGENCY DEPARTMENT       Felix Carter MD  01/13/20 1135

## 2020-01-10 NOTE — DISCHARGE INSTRUCTIONS
Please use Tylenol every 4 hours and ibuprofen every 6 while awake.  Continue oral hydration at home.  Influenza will pass.  There is no signs of a heart problem or any signs of pneumonia by chest x-ray.  Return with worsening shortness of breath or sudden progressively worsening symptoms.  Influenza is a 7 to 10-day illness at minimum.

## 2020-01-10 NOTE — ED NOTES
Pt provided with discharge paperwork and educated on recommended follow-up with PCP. Pt educated on how to manage symptoms at home appropriately. Pt voiced understanding and denied any questions at discharge.

## 2020-01-10 NOTE — ED TRIAGE NOTES
Patient comes in for evaluation of increased shortness of breath and chest pain. Patient was seen here last week and diagnosed with influenza, no tamiflu prescribed. Patient since then has had worsening shortness of breath as well as decreased appetite. Patient also notes chest pain, states he has a history of a murmur which he is supposed to have surgically repaired someday. Lung sounds are diminished.

## 2024-05-07 ENCOUNTER — HOSPITAL ENCOUNTER (EMERGENCY)
Dept: HOSPITAL 56 - MW.ED | Age: 34
Discharge: HOME | End: 2024-05-07
Payer: SELF-PAY

## 2024-05-07 DIAGNOSIS — R07.9: Primary | ICD-10-CM

## 2024-05-07 LAB
ALBUMIN SERPL-MCNC: 4.2 G/DL (ref 3.4–5)
ALBUMIN/GLOB SERPL: 1.1 {RATIO} (ref 0.9–1.6)
ALP SERPL-CCNC: 37 U/L (ref 46–116)
ALT SERPL-CCNC: 19 IU/L (ref 14–63)
AST SERPL-CCNC: 18 IU/L (ref 15–37)
BASOPHILS # BLD AUTO: 0.07 K/UL (ref 0–0.2)
BASOPHILS NFR BLD AUTO: 1.2 % (ref 0–1)
BILIRUB SERPL-MCNC: 0.4 MG/DL (ref 0.2–1)
BUN SERPL-MCNC: 11 MG/DL (ref 7–18)
CALCIUM SERPL-MCNC: 9.5 MG/DL (ref 8.5–10.1)
CHLORIDE SERPL-SCNC: 103 MMOL/L (ref 98–107)
CO2 SERPL-SCNC: 26.6 MMOL/L (ref 21–32)
CREAT CL 24H UR+SERPL-VRATE: 80.86 ML/MIN
CREAT SERPL-MCNC: 1.1 MG/DL (ref 0.8–1.3)
EGFRCR SERPLBLD CKD-EPI 2021: 91 ML/MIN (ref 60–?)
EOSINOPHIL # BLD AUTO: 0.12 K/UL (ref 0–0.45)
EOSINOPHIL NFR BLD AUTO: 2 % (ref 0–6)
GLOBULIN SER-MCNC: 3.7 G/DL (ref 2.6–4)
GLUCOSE SERPL-MCNC: 92 MG/DL (ref 74–106)
HCT VFR BLD AUTO: 43.2 % (ref 42–52)
HGB BLD-MCNC: 15.3 G/DL (ref 14–18)
IMM GRANULOCYTES # BLD: 0.01 K/UL (ref 0–0.05)
IMM GRANULOCYTES NFR BLD: 0.2 % (ref 0–0.4)
LYMPHOCYTES # BLD AUTO: 2.28 K/UL (ref 1–4.8)
LYMPHOCYTES NFR BLD AUTO: 37.9 % (ref 24–44)
MCH RBC QN AUTO: 30.4 PG (ref 28–32)
MCHC RBC AUTO-ENTMCNC: 35.4 G/DL (ref 32–36)
MCHC RBC AUTO-ENTMCNC: 85.7 FL (ref 83–99)
MONOCYTES # BLD AUTO: 0.6 K/UL (ref 0–0.8)
MONOCYTES NFR BLD AUTO: 10 % (ref 0–8)
NEUTROPHILS # BLD AUTO: 2.93 K/UL (ref 1.8–7.7)
NEUTROPHILS NFR BLD AUTO: 48.7 % (ref 41–71)
NRBC BLD AUTO-RTO: 0 /100WBC (ref 0–0.2)
NRBC BLD AUTO-RTO: 0 K/UL (ref 0–0.02)
PLATELET # BLD AUTO: 282 K/UL (ref 150–400)
PMV BLD AUTO: 9.5 FL (ref 9.4–12.4)
POTASSIUM SERPL-SCNC: 3.9 MMOL/L (ref 3.5–5.1)
PROT SERPL-MCNC: 7.9 G/DL (ref 6.4–8.2)
RBC # BLD AUTO: 5.04 M/UL (ref 4.52–5.9)
SODIUM SERPL-SCNC: 141 MMOL/L (ref 136–148)
WBC # BLD AUTO: 6.01 K/UL (ref 3.9–11.3)

## 2024-05-07 PROCEDURE — 93005 ELECTROCARDIOGRAM TRACING: CPT

## 2024-05-07 PROCEDURE — 84484 ASSAY OF TROPONIN QUANT: CPT

## 2024-05-07 PROCEDURE — 36415 COLL VENOUS BLD VENIPUNCTURE: CPT

## 2024-05-07 PROCEDURE — 80053 COMPREHEN METABOLIC PANEL: CPT

## 2024-05-07 PROCEDURE — 71045 X-RAY EXAM CHEST 1 VIEW: CPT

## 2024-05-07 PROCEDURE — 99285 EMERGENCY DEPT VISIT HI MDM: CPT

## 2024-05-07 PROCEDURE — 96374 THER/PROPH/DIAG INJ IV PUSH: CPT

## 2024-05-07 PROCEDURE — 85025 COMPLETE CBC W/AUTO DIFF WBC: CPT

## 2024-05-07 RX ADMIN — KETOROLAC TROMETHAMINE ONE MG: 30 INJECTION, SOLUTION INTRAMUSCULAR at 16:35
